# Patient Record
Sex: MALE | Race: WHITE | NOT HISPANIC OR LATINO | Employment: FULL TIME | URBAN - METROPOLITAN AREA
[De-identification: names, ages, dates, MRNs, and addresses within clinical notes are randomized per-mention and may not be internally consistent; named-entity substitution may affect disease eponyms.]

---

## 2016-07-25 LAB — CRC RECOMMENDATION EXT: NORMAL

## 2017-08-22 LAB
BUN SERPL-MCNC: 18 MG/DL (ref 7–18)
CALCIUM SERPL-MCNC: 9.6 MG/DL (ref 8.5–10.1)
CHLORIDE SERPL-SCNC: 109 MMOL/L (ref 98–107)
CHOLEST SERPL-MCNC: 245 MG/DL
CO2 SERPL-SCNC: 25 MMOL/L (ref 21–32)
CREAT SERPL-MCNC: 0.94 MG/DL (ref 0.6–1.3)
GLUCOSE SERPL-MCNC: 99 MG/DL (ref 74–106)
HDLC SERPL-MCNC: 48 MG/DL (ref 35–60)
LDLC SERPL CALC-MCNC: 163 MG/DL
POTASSIUM SERPL-SCNC: 4.4 MMOL/L (ref 3.5–5.1)
SODIUM SERPL-SCNC: 137 MEQ/L (ref 131–145)
TRIGL SERPL-MCNC: 121 MG/DL (ref 30–150)

## 2019-01-29 ENCOUNTER — HISTORICAL (OUTPATIENT)
Dept: ADMINISTRATIVE | Facility: HOSPITAL | Age: 59
End: 2019-01-29

## 2019-01-29 LAB
ABS NEUT (OLG): 3.6 X10(3)/MCL (ref 2.1–9.2)
ALBUMIN SERPL-MCNC: 4.4 GM/DL (ref 3.4–5)
ALBUMIN/GLOB SERPL: 2 {RATIO} (ref 1.5–2.5)
ALP SERPL-CCNC: 52 UNIT/L (ref 38–126)
ALT SERPL-CCNC: 35 UNIT/L (ref 7–52)
APPEARANCE, UA: CLEAR
AST SERPL-CCNC: 23 UNIT/L (ref 15–37)
BACTERIA #/AREA URNS AUTO: ABNORMAL /HPF
BILIRUB SERPL-MCNC: 0.4 MG/DL (ref 0.2–1)
BILIRUB UR QL STRIP: NEGATIVE MG/DL
BILIRUBIN DIRECT+TOT PNL SERPL-MCNC: 0.1 MG/DL (ref 0–0.5)
BILIRUBIN DIRECT+TOT PNL SERPL-MCNC: 0.3 MG/DL
BUN SERPL-MCNC: 19 MG/DL (ref 7–18)
CALCIUM SERPL-MCNC: 8.9 MG/DL (ref 8.5–10)
CHLORIDE SERPL-SCNC: 99 MMOL/L (ref 98–107)
CO2 SERPL-SCNC: 26 MMOL/L (ref 21–32)
COLOR UR: YELLOW
CREAT SERPL-MCNC: 1.16 MG/DL (ref 0.6–1.3)
ERYTHROCYTE [DISTWIDTH] IN BLOOD BY AUTOMATED COUNT: 12.4 % (ref 11.5–17)
GLOBULIN SER-MCNC: 2.2 GM/DL (ref 1.2–3)
GLUCOSE (UA): NEGATIVE MG/DL
GLUCOSE SERPL-MCNC: 92 MG/DL (ref 74–106)
HCT VFR BLD AUTO: 49.2 % (ref 42–52)
HGB BLD-MCNC: 15.7 GM/DL (ref 14–18)
HGB UR QL STRIP: ABNORMAL UNIT/L
KETONES UR QL STRIP: NEGATIVE MG/DL
LEUKOCYTE ESTERASE UR QL STRIP: NEGATIVE UNIT/L
LYMPHOCYTES # BLD AUTO: 1.6 X10(3)/MCL (ref 0.6–3.4)
LYMPHOCYTES NFR BLD AUTO: 27.6 % (ref 13–40)
MCH RBC QN AUTO: 30.2 PG (ref 27–31.2)
MCHC RBC AUTO-ENTMCNC: 32 GM/DL (ref 32–36)
MCV RBC AUTO: 95 FL (ref 80–94)
MONOCYTES # BLD AUTO: 0.6 X10(3)/MCL (ref 0.1–1.3)
MONOCYTES NFR BLD AUTO: 10.4 % (ref 0.1–24)
NEUTROPHILS NFR BLD AUTO: 62 % (ref 47–80)
NITRITE UR QL STRIP.AUTO: NEGATIVE
PH UR STRIP: 6 [PH]
PLATELET # BLD AUTO: 233 X10(3)/MCL (ref 130–400)
PMV BLD AUTO: 9.2 FL (ref 9.4–12.4)
POTASSIUM SERPL-SCNC: 3.8 MMOL/L (ref 3.5–5.1)
PROT SERPL-MCNC: 6.6 GM/DL (ref 6.4–8.2)
PROT UR QL STRIP: NEGATIVE MG/DL
RBC # BLD AUTO: 5.2 X10(6)/MCL (ref 4.7–6.1)
RBC #/AREA URNS HPF: ABNORMAL /HPF
SODIUM SERPL-SCNC: 130 MMOL/L (ref 136–145)
SP GR UR STRIP: 1.01
SQUAMOUS EPITHELIAL, UA: ABNORMAL /LPF
UROBILINOGEN UR STRIP-ACNC: 0.2 MG/DL
WBC # SPEC AUTO: 5.8 X10(3)/MCL (ref 4.5–11.5)
WBC #/AREA URNS AUTO: ABNORMAL /[HPF]

## 2019-02-01 ENCOUNTER — HISTORICAL (OUTPATIENT)
Dept: LAB | Facility: HOSPITAL | Age: 59
End: 2019-02-01

## 2019-02-02 LAB — GRAM STN SPEC: NORMAL

## 2019-02-04 LAB — FINAL CULTURE: NORMAL

## 2019-03-14 ENCOUNTER — HISTORICAL (OUTPATIENT)
Dept: LAB | Facility: HOSPITAL | Age: 59
End: 2019-03-14

## 2019-03-14 ENCOUNTER — HISTORICAL (OUTPATIENT)
Dept: ADMINISTRATIVE | Facility: HOSPITAL | Age: 59
End: 2019-03-14

## 2019-03-14 LAB
ABS NEUT (OLG): 2.5 X10(3)/MCL (ref 2.1–9.2)
ALBUMIN SERPL-MCNC: 4.4 GM/DL (ref 3.4–5)
ALBUMIN/GLOB SERPL: 1.76 {RATIO} (ref 1.5–2.5)
ALP SERPL-CCNC: 52 UNIT/L (ref 38–126)
ALT SERPL-CCNC: 41 UNIT/L (ref 7–52)
APPEARANCE, UA: CLEAR
AST SERPL-CCNC: 23 UNIT/L (ref 15–37)
BACTERIA #/AREA URNS AUTO: NORMAL /HPF
BILIRUB SERPL-MCNC: 0.6 MG/DL (ref 0.2–1)
BILIRUB UR QL STRIP: NEGATIVE MG/DL
BILIRUBIN DIRECT+TOT PNL SERPL-MCNC: 0.1 MG/DL (ref 0–0.5)
BILIRUBIN DIRECT+TOT PNL SERPL-MCNC: 0.5 MG/DL
BUN SERPL-MCNC: 19 MG/DL (ref 7–18)
CALCIUM SERPL-MCNC: 9.1 MG/DL (ref 8.5–10)
CHLORIDE SERPL-SCNC: 106 MMOL/L (ref 98–107)
CHOLEST SERPL-MCNC: 284 MG/DL (ref 0–200)
CHOLEST/HDLC SERPL: 6.2 {RATIO}
CO2 SERPL-SCNC: 25 MMOL/L (ref 21–32)
COLOR UR: YELLOW
CREAT SERPL-MCNC: 0.95 MG/DL (ref 0.6–1.3)
ERYTHROCYTE [DISTWIDTH] IN BLOOD BY AUTOMATED COUNT: 12.3 % (ref 11.5–17)
GLOBULIN SER-MCNC: 2.5 GM/DL (ref 1.2–3)
GLUCOSE (UA): NEGATIVE MG/DL
GLUCOSE SERPL-MCNC: 96 MG/DL (ref 74–106)
HCT VFR BLD AUTO: 46.8 % (ref 42–52)
HCV AB SERPL QL IA: NEGATIVE
HDLC SERPL-MCNC: 46 MG/DL (ref 35–60)
HGB BLD-MCNC: 16 GM/DL (ref 14–18)
HGB UR QL STRIP: NEGATIVE UNIT/L
KETONES UR QL STRIP: NEGATIVE MG/DL
LDLC SERPL CALC-MCNC: 184 MG/DL (ref 0–129)
LEUKOCYTE ESTERASE UR QL STRIP: NEGATIVE UNIT/L
LYMPHOCYTES # BLD AUTO: 2.1 X10(3)/MCL (ref 0.6–3.4)
LYMPHOCYTES NFR BLD AUTO: 43.2 % (ref 13–40)
MCH RBC QN AUTO: 30.6 PG (ref 27–31.2)
MCHC RBC AUTO-ENTMCNC: 34 GM/DL (ref 32–36)
MCV RBC AUTO: 90 FL (ref 80–94)
MONOCYTES # BLD AUTO: 0.2 X10(3)/MCL (ref 0.1–1.3)
MONOCYTES NFR BLD AUTO: 4.3 % (ref 0.1–24)
NEUTROPHILS NFR BLD AUTO: 52.5 % (ref 47–80)
NITRITE UR QL STRIP.AUTO: NEGATIVE
PH UR STRIP: 6.5 [PH]
PLATELET # BLD AUTO: 225 X10(3)/MCL (ref 130–400)
PMV BLD AUTO: 9.3 FL (ref 9.4–12.4)
POTASSIUM SERPL-SCNC: 4.4 MMOL/L (ref 3.5–5.1)
PROT SERPL-MCNC: 6.9 GM/DL (ref 6.4–8.2)
PROT UR QL STRIP: NEGATIVE MG/DL
PSA SERPL-MCNC: 3.24 NG/ML (ref 0–4.5)
RBC # BLD AUTO: 5.23 X10(6)/MCL (ref 4.7–6.1)
RBC #/AREA URNS HPF: NORMAL /HPF
SODIUM SERPL-SCNC: 137 MMOL/L (ref 136–145)
SP GR UR STRIP: 1.01
SQUAMOUS EPITHELIAL, UA: NORMAL /LPF
TRIGL SERPL-MCNC: 126 MG/DL (ref 30–150)
UROBILINOGEN UR STRIP-ACNC: 0.2 MG/DL
VLDLC SERPL CALC-MCNC: 25.2 MG/DL
WBC # SPEC AUTO: 4.8 X10(3)/MCL (ref 4.5–11.5)
WBC #/AREA URNS AUTO: NORMAL /[HPF]

## 2019-04-22 ENCOUNTER — HISTORICAL (OUTPATIENT)
Dept: ADMINISTRATIVE | Facility: HOSPITAL | Age: 59
End: 2019-04-22

## 2019-04-22 LAB
ABS NEUT (OLG): 4.3 X10(3)/MCL (ref 2.1–9.2)
ALBUMIN SERPL-MCNC: 4.4 GM/DL (ref 3.4–5)
ALBUMIN/GLOB SERPL: 2.1 {RATIO} (ref 1.5–2.5)
ALP SERPL-CCNC: 52 UNIT/L (ref 38–126)
ALT SERPL-CCNC: 24 UNIT/L (ref 7–52)
APPEARANCE, UA: CLEAR
AST SERPL-CCNC: 17 UNIT/L (ref 15–37)
BACTERIA #/AREA URNS AUTO: ABNORMAL /HPF
BILIRUB SERPL-MCNC: 0.5 MG/DL (ref 0.2–1)
BILIRUB UR QL STRIP: NEGATIVE MG/DL
BILIRUBIN DIRECT+TOT PNL SERPL-MCNC: 0.1 MG/DL (ref 0–0.5)
BILIRUBIN DIRECT+TOT PNL SERPL-MCNC: 0.4 MG/DL
BUN SERPL-MCNC: 17 MG/DL (ref 7–18)
CALCIUM SERPL-MCNC: 9.1 MG/DL (ref 8.5–10)
CHLORIDE SERPL-SCNC: 108 MMOL/L (ref 98–107)
CO2 SERPL-SCNC: 25 MMOL/L (ref 21–32)
COLOR UR: YELLOW
CREAT SERPL-MCNC: 1.14 MG/DL (ref 0.6–1.3)
ERYTHROCYTE [DISTWIDTH] IN BLOOD BY AUTOMATED COUNT: 12.4 % (ref 11.5–17)
GLOBULIN SER-MCNC: 2.2 GM/DL (ref 1.2–3)
GLUCOSE (UA): NEGATIVE MG/DL
GLUCOSE SERPL-MCNC: 109 MG/DL (ref 74–106)
HCT VFR BLD AUTO: 45.9 % (ref 42–52)
HGB BLD-MCNC: 15.9 GM/DL (ref 14–18)
HGB UR QL STRIP: ABNORMAL UNIT/L
KETONES UR QL STRIP: NEGATIVE MG/DL
LEUKOCYTE ESTERASE UR QL STRIP: NEGATIVE UNIT/L
LYMPHOCYTES # BLD AUTO: 1.8 X10(3)/MCL (ref 0.6–3.4)
LYMPHOCYTES NFR BLD AUTO: 26.5 % (ref 13–40)
MCH RBC QN AUTO: 31.3 PG (ref 27–31.2)
MCHC RBC AUTO-ENTMCNC: 35 GM/DL (ref 32–36)
MCV RBC AUTO: 90 FL (ref 80–94)
MONOCYTES # BLD AUTO: 0.7 X10(3)/MCL (ref 0.1–1.3)
MONOCYTES NFR BLD AUTO: 10.2 % (ref 0.1–24)
NEUTROPHILS NFR BLD AUTO: 63.3 % (ref 47–80)
NITRITE UR QL STRIP.AUTO: NEGATIVE
PH UR STRIP: 5.5 [PH]
PLATELET # BLD AUTO: 200 X10(3)/MCL (ref 130–400)
PMV BLD AUTO: 9.5 FL (ref 9.4–12.4)
POTASSIUM SERPL-SCNC: 4.2 MMOL/L (ref 3.5–5.1)
PROT SERPL-MCNC: 6.5 GM/DL (ref 6.4–8.2)
PROT UR QL STRIP: NEGATIVE MG/DL
RBC # BLD AUTO: 5.08 X10(6)/MCL (ref 4.7–6.1)
RBC #/AREA URNS HPF: ABNORMAL /HPF
SODIUM SERPL-SCNC: 138 MMOL/L (ref 136–145)
SP GR UR STRIP: 1.02
SQUAMOUS EPITHELIAL, UA: ABNORMAL /LPF
UROBILINOGEN UR STRIP-ACNC: 0.2 MG/DL
WBC # SPEC AUTO: 6.8 X10(3)/MCL (ref 4.5–11.5)
WBC #/AREA URNS AUTO: ABNORMAL /[HPF]

## 2019-04-29 ENCOUNTER — HISTORICAL (OUTPATIENT)
Dept: LAB | Facility: HOSPITAL | Age: 59
End: 2019-04-29

## 2019-04-29 LAB — GRAM STN SPEC: NORMAL

## 2019-05-01 LAB — FINAL CULTURE: NORMAL

## 2020-06-11 ENCOUNTER — HISTORICAL (OUTPATIENT)
Dept: ADMINISTRATIVE | Facility: HOSPITAL | Age: 60
End: 2020-06-11

## 2020-06-11 LAB
ABS NEUT (OLG): 2.2 X10(3)/MCL (ref 2.1–9.2)
ALBUMIN SERPL-MCNC: 4.8 GM/DL (ref 3.4–5)
ALBUMIN/GLOB SERPL: 2.29 {RATIO} (ref 1.5–2.5)
ALP SERPL-CCNC: 51 UNIT/L (ref 38–126)
ALT SERPL-CCNC: 32 UNIT/L (ref 7–52)
APPEARANCE, UA: CLEAR
AST SERPL-CCNC: 24 UNIT/L (ref 15–37)
BACTERIA #/AREA URNS AUTO: NORMAL /HPF
BILIRUB SERPL-MCNC: 0.5 MG/DL (ref 0.2–1)
BILIRUB UR QL STRIP: NEGATIVE MG/DL
BILIRUBIN DIRECT+TOT PNL SERPL-MCNC: 0.1 MG/DL (ref 0–0.5)
BILIRUBIN DIRECT+TOT PNL SERPL-MCNC: 0.4 MG/DL
BUN SERPL-MCNC: 20 MG/DL (ref 7–18)
CALCIUM SERPL-MCNC: 9.6 MG/DL (ref 8.5–10)
CHLORIDE SERPL-SCNC: 104 MMOL/L (ref 98–107)
CHOLEST SERPL-MCNC: 237 MG/DL (ref 0–200)
CHOLEST/HDLC SERPL: 5.2 {RATIO}
CO2 SERPL-SCNC: 29 MMOL/L (ref 21–32)
COLOR UR: YELLOW
CREAT SERPL-MCNC: 1.08 MG/DL (ref 0.6–1.3)
ERYTHROCYTE [DISTWIDTH] IN BLOOD BY AUTOMATED COUNT: 13 % (ref 11.5–17)
GLOBULIN SER-MCNC: 2.2 GM/DL (ref 1.2–3)
GLUCOSE (UA): NEGATIVE MG/DL
GLUCOSE SERPL-MCNC: 95 MG/DL (ref 74–106)
HCT VFR BLD AUTO: 49.1 % (ref 42–52)
HDLC SERPL-MCNC: 46 MG/DL (ref 35–60)
HGB BLD-MCNC: 16.5 GM/DL (ref 14–18)
HGB UR QL STRIP: NEGATIVE UNIT/L
KETONES UR QL STRIP: NEGATIVE MG/DL
LDLC SERPL CALC-MCNC: 155 MG/DL (ref 0–129)
LEUKOCYTE ESTERASE UR QL STRIP: NEGATIVE UNIT/L
LYMPHOCYTES # BLD AUTO: 1.6 X10(3)/MCL (ref 0.6–3.4)
LYMPHOCYTES NFR BLD AUTO: 35.8 % (ref 13–40)
MCH RBC QN AUTO: 30.4 PG (ref 27–31.2)
MCHC RBC AUTO-ENTMCNC: 34 GM/DL (ref 32–36)
MCV RBC AUTO: 90 FL (ref 80–94)
MONOCYTES # BLD AUTO: 0.6 X10(3)/MCL (ref 0.1–1.3)
MONOCYTES NFR BLD AUTO: 12.8 % (ref 0.1–24)
NEUTROPHILS NFR BLD AUTO: 51.4 % (ref 47–80)
NITRITE UR QL STRIP.AUTO: NEGATIVE
PH UR STRIP: 6 [PH]
PLATELET # BLD AUTO: 213 X10(3)/MCL (ref 130–400)
PMV BLD AUTO: 9.4 FL (ref 9.4–12.4)
POTASSIUM SERPL-SCNC: 4.6 MMOL/L (ref 3.5–5.1)
PROT SERPL-MCNC: 6.9 GM/DL (ref 6.4–8.2)
PROT UR QL STRIP: NEGATIVE MG/DL
PSA SERPL-MCNC: 3.59 NG/ML (ref 0–4.5)
RBC # BLD AUTO: 5.43 X10(6)/MCL (ref 4.7–6.1)
RBC #/AREA URNS HPF: NORMAL /HPF
SODIUM SERPL-SCNC: 138 MMOL/L (ref 136–145)
SP GR UR STRIP: 1.02
SQUAMOUS EPITHELIAL, UA: NORMAL /LPF
TRIGL SERPL-MCNC: 134 MG/DL (ref 30–150)
UROBILINOGEN UR STRIP-ACNC: 0.2 MG/DL
VLDLC SERPL CALC-MCNC: 26.8 MG/DL
WBC # SPEC AUTO: 4.4 X10(3)/MCL (ref 4.5–11.5)
WBC #/AREA URNS AUTO: NORMAL /[HPF]

## 2020-08-26 ENCOUNTER — HISTORICAL (OUTPATIENT)
Dept: PREADMISSION TESTING | Facility: HOSPITAL | Age: 60
End: 2020-08-26

## 2020-09-01 ENCOUNTER — HISTORICAL (OUTPATIENT)
Dept: ADMINISTRATIVE | Facility: HOSPITAL | Age: 60
End: 2020-09-01

## 2021-06-18 ENCOUNTER — HISTORICAL (OUTPATIENT)
Dept: ADMINISTRATIVE | Facility: HOSPITAL | Age: 61
End: 2021-06-18

## 2021-06-18 LAB
ABS NEUT (OLG): 2.6 X10(3)/MCL (ref 2.1–9.2)
ALBUMIN SERPL-MCNC: 4.6 GM/DL (ref 3.4–5)
ALBUMIN/GLOB SERPL: 2.19 {RATIO} (ref 1.5–2.5)
ALP SERPL-CCNC: 46 UNIT/L (ref 38–126)
ALT SERPL-CCNC: 24 UNIT/L (ref 7–52)
APPEARANCE, UA: CLEAR
AST SERPL-CCNC: 20 UNIT/L (ref 15–37)
BACTERIA #/AREA URNS AUTO: NORMAL /HPF
BILIRUB SERPL-MCNC: 0.8 MG/DL (ref 0.2–1)
BILIRUB UR QL STRIP: NEGATIVE MG/DL
BILIRUBIN DIRECT+TOT PNL SERPL-MCNC: 0.1 MG/DL (ref 0–0.5)
BILIRUBIN DIRECT+TOT PNL SERPL-MCNC: 0.7 MG/DL
BUN SERPL-MCNC: 15 MG/DL (ref 7–18)
CALCIUM SERPL-MCNC: 9.7 MG/DL (ref 8.5–10.1)
CHLORIDE SERPL-SCNC: 106 MMOL/L (ref 98–107)
CHOLEST SERPL-MCNC: 258 MG/DL (ref 0–200)
CHOLEST/HDLC SERPL: 5.7 {RATIO}
CO2 SERPL-SCNC: 28 MMOL/L (ref 21–32)
COLOR UR: YELLOW
CREAT SERPL-MCNC: 1.02 MG/DL (ref 0.6–1.3)
ERYTHROCYTE [DISTWIDTH] IN BLOOD BY AUTOMATED COUNT: 12.2 % (ref 11.5–17)
EST CREAT CLEARANCE SER (OHS): 122.25 ML/MIN
GLOBULIN SER-MCNC: 2.1 GM/DL (ref 1.2–3)
GLUCOSE (UA): NEGATIVE MG/DL
GLUCOSE SERPL-MCNC: 88 MG/DL (ref 74–106)
HCT VFR BLD AUTO: 45.1 % (ref 42–52)
HDLC SERPL-MCNC: 45 MG/DL (ref 35–60)
HGB BLD-MCNC: 15.3 GM/DL (ref 14–18)
HGB UR QL STRIP: NEGATIVE UNIT/L
KETONES UR QL STRIP: NEGATIVE MG/DL
LDLC SERPL CALC-MCNC: 190 MG/DL (ref 0–129)
LEUKOCYTE ESTERASE UR QL STRIP: NEGATIVE UNIT/L
LYMPHOCYTES # BLD AUTO: 1.5 X10(3)/MCL (ref 0.6–3.4)
LYMPHOCYTES NFR BLD AUTO: 32.7 % (ref 13–40)
MCH RBC QN AUTO: 30.2 PG (ref 27–31.2)
MCHC RBC AUTO-ENTMCNC: 34 GM/DL (ref 32–36)
MCV RBC AUTO: 89 FL (ref 80–94)
MONOCYTES # BLD AUTO: 0.6 X10(3)/MCL (ref 0.1–1.3)
MONOCYTES NFR BLD AUTO: 11.9 % (ref 0.1–24)
NEUTROPHILS NFR BLD AUTO: 55.4 % (ref 47–80)
NITRITE UR QL STRIP.AUTO: NEGATIVE
PH UR STRIP: 6 [PH]
PLATELET # BLD AUTO: 238 X10(3)/MCL (ref 130–400)
PMV BLD AUTO: 9.8 FL (ref 9.4–12.4)
POTASSIUM SERPL-SCNC: 4.1 MMOL/L (ref 3.5–5.1)
PROT SERPL-MCNC: 6.7 GM/DL (ref 6.4–8.2)
PROT UR QL STRIP: NEGATIVE MG/DL
PSA SERPL-MCNC: 3.96 NG/ML (ref 0–4.5)
RBC # BLD AUTO: 5.07 X10(6)/MCL (ref 4.7–6.1)
RBC #/AREA URNS HPF: NORMAL /HPF
SODIUM SERPL-SCNC: 140 MMOL/L (ref 136–145)
SP GR UR STRIP: 1.02
SQUAMOUS EPITHELIAL, UA: NORMAL /LPF
TRIGL SERPL-MCNC: 160 MG/DL (ref 30–150)
UROBILINOGEN UR STRIP-ACNC: 0.2 MG/DL
VLDLC SERPL CALC-MCNC: 32 MG/DL
WBC # SPEC AUTO: 4.7 X10(3)/MCL (ref 4.5–11.5)
WBC #/AREA URNS AUTO: NORMAL /[HPF]

## 2021-08-09 ENCOUNTER — HISTORICAL (OUTPATIENT)
Dept: ADMINISTRATIVE | Facility: HOSPITAL | Age: 61
End: 2021-08-09

## 2021-08-09 LAB — URATE SERPL-MCNC: 6.7 MG/DL (ref 2.6–7.2)

## 2021-08-20 ENCOUNTER — HISTORICAL (OUTPATIENT)
Dept: ADMINISTRATIVE | Facility: HOSPITAL | Age: 61
End: 2021-08-20

## 2022-02-11 ENCOUNTER — HISTORICAL (OUTPATIENT)
Dept: ADMINISTRATIVE | Facility: HOSPITAL | Age: 62
End: 2022-02-11

## 2022-03-02 ENCOUNTER — HISTORICAL (OUTPATIENT)
Dept: ADMINISTRATIVE | Facility: HOSPITAL | Age: 62
End: 2022-03-02

## 2022-04-10 ENCOUNTER — HISTORICAL (OUTPATIENT)
Dept: ADMINISTRATIVE | Facility: HOSPITAL | Age: 62
End: 2022-04-10
Payer: COMMERCIAL

## 2022-04-29 VITALS
DIASTOLIC BLOOD PRESSURE: 76 MMHG | BODY MASS INDEX: 34.7 KG/M2 | WEIGHT: 256.19 LBS | SYSTOLIC BLOOD PRESSURE: 122 MMHG | HEIGHT: 72 IN

## 2022-04-30 NOTE — OP NOTE
Patient:   Suleiman Rutledge            MRN: [id number]            FIN: [id number]               Age:   60 years     Sex:  Male     :  1960   Associated Diagnoses:   None   Author:   Lamberto Montanez MD      Preoperative Diagnosis: Cataract Right eye    Postoperative Diagnosis: Cataract Right eye    Procedure: Phacoemulsification with intaocular lens implantations Right eye    Surgeon: Lamberto Montanez MD    Assistant: Cinthya Sinclair, [facility]    Anestheisa: MAC    Complications: None    The patient was brought into the operating suite, where the patient was correctly identified as was the operative eye via timeout.  The patient was prepped and draped in a sterile ophthalmic fashion.  A lid speculum was placed in the operative eye and the microscope was brought into place.  A 1.0mm paracentesis was then made at (12) o'clock.  The anterior chamber was filled with Endocoat.  A (temporal) clear corneal incision was made with a 2.4 mm keratome.  A 6 mm corneal marking ring was used to sumeet the cornea centered over the visual axis.  A 5.00 mm continuous curvilinear capsulorhexis was fashioned using a cystotome and microcapsular forceps.  Hydrodissection and hydrodelineation was performed with upreserved 1% Xylocaine.  The nucleus was then phacoemulsified with the Abbott phacoemulsification hand-piece with a total of (7) EFX.  The cortex was then removed with the I/A hand-piece. The lens model ([id number]) with a power of (28.0) was placed in the capsular bag.  The Helon was then removed from the eye with the I/A hand piece.  The anterior chamber was inflated and the wounds were hydrated with BSS.  The wounds were checked with Weck-Elizabeth sponges and found to be watertight.  The lid speculum was removed and topical antibiotics were placed on the operative eye.  The patient was brought to PACU in good condition.

## 2022-05-02 NOTE — HISTORICAL [facility] CERNER
This is a historical note converted from [employer]. Formatting and pictures may have been removed.  Please reference Cecilia for original formatting and attached multimedia. Chief Complaint  wellness  History of Present Illness  The patient is a 58 year old white male here today for a complete Wellness physical.? The patient?has?no acute complaints today. The patient also carries a diagnosis of?chronic GERD/ED, which is assessed today?as stable.? Exercise is reported as high and includes?weights and cardio with no chest pain or shortness of breath.?? Diet modifications are reported as?lean proteins and increased vegetable matter?and decrease fried fatty foods and decreased volume.?? Previous documented weight is recorded? as charted.  The patient also reports?quitting smoking 6 months ago and plans on?staying quit?indefinitely.? Also note of some?very small?nodules?noted on his calcium score?in the past?that recommended?follow-up.  GERD stable currently?on intermittent Nexium use and diet control.? Insomnia and anxiety-depression controlled with lifestyle modifications and no medication.? No suicidal or homicidal ideations.  Review of Systems  Constitutional:?no weight gain,?no weight loss,?no fatigue,?no fever,?no chills,?no weakness,?no trouble sleeping.  Eyes:?no vision loss/changes,?no glasses or contacts,?no pain,?no redness,?no blurry or double vision,?no flashing lights,?no specks,?no glaucoma,?no cataracts.  Last eye exam:?within last 6 months  Head:?no headache,?no head injury,?no neck pain.?  Neck:??no lumps,?no swollen glands,?no stiffness.  Ears:?no decreased hearing,?no ringing,?no earache,?no drainage.?  Nose:?no stuffiness,?no discharge,?no itching,?no hay fever,?no nosebleeds,?no sinus pain.  Throat:?no bleeding,?no dentures,?no sore tongue,?no dry mouth,?no sore throat,?no hoarseness,?no thrush,?no non-healing sores.  Cardiovascular:?no chest pain or discomfort,?no tightness,?no palpitations,?no SOB  with activity,?no difficulty breathing while supine,?no swelling,?no sudden awakening from sleep with SOB.  Vascular:?no calf pain with walking,?no leg cramping.  Respiratory:??no cough,?no sputum,?no coughing up blood,?no SOB,?no wheezing,?no painful breathing.  Gastrointestinal:?no swallowing difficulty,?no heartburn,?no change in appetite,?no nausea,?no change in bowel habits,?no rectal bleeding,?no constipation,?no diarrhea,?no yellow eyes or skin.  Urinary:?no frequency,?no urgency,?no burning or pain,?no blood in urine,?no incontinence,?no change in urinary strength.  Musculoskeletal:?no muscle or joint pain,?no stiffness,?no back pain,?no redness of joints,?no swelling of joints,?no trauma.  Skin:?no rashes,?no lumps,?no itching,?no dryness,?color normal for ethnicity,?no hair or nail changes.  Neurologic:?no dizziness,?no fainting,?no seizures,?no weakness,?no numbness,?no tingling,?no tremors.  Psychiatric:?no nervousness,?no stress,?no depression,?no memory loss.  Endocrine:?no heat or cold intolerance,?no sweating,?no frequent urination,?no thirst,?no change in appetite.  Hematologic:?no ease of bruising,?no ease of bleeding.  ?  ?  ?  ?  Physical Exam  Vitals & Measurements  BP:?120/78?  HT:?185?cm? WT:?111.4?kg? BMI:?32.55?  VITAL SIGNS:? Reviewed.? ?  GENERAL:? In?no apparent distress.? Alert and Oriented x3  HEAD:?No signsof head trauma. Normocephalic  EYES:? Pupils?equal/round/reactive.? Extraocular motionsintact.  EARS:? Hearing?grossly intact. TMs and EAC?clear  MOUTH:??Oropharynx is clear.?No erythema. No exudates  NECK:? No LAD. No JVD. No thyromegaly. No bruits  CHEST:? Chest with clear breath sounds bilaterally.? No wheezes, rales, or rhonchi. Good air movement  CARDIAC:? Regular rate and rhythm.? S1 and S2, without murmurs, gallops, or rubs.  VASCULAR:??No Edema.? Peripheral pulses normal and equal in all extremities.  ABDOMEN:?Soft, without detectable tenderness.??No sign of distention.?No  rebound or guarding, and no masses palpated.? ?Bowel Sounds present and normal x 4.  MUSCULOSKELETAL:??Good range of motion of all major joints.?5/5 strength throughout. Extremities without clubbing, cyanosis or edema.  NEUROLOGIC EXAM:? Alert and oriented x 3.? No focal sensory or strength deficits.? ?Speech normal.? Follows commands.  PSYCHIATRIC:? Mood normal.  SKIN:??No rash or lesions.  ESTHER: Nontender, normal size, no masses or nodules, normal sphincter tone, symmetric  ?  Assessment/Plan  1.?Wellness examination?Z00.00  ?Assessment/Plan:  ?   1.?Wellness  ?   -Health and Exercise Prescription issued and educated upon  ?   -10% weight loss goal  ?   -Lifestyle counseling >20minutes  ?   -Diet: Lean proteins and increased vegetable matter  ?   -Screening: Colonoscopy?is up-to-date;?prostate exam done today-normal; PSA drawn?ESTHER: Nontender, normal size, no masses or nodules, normal sphincter tone, symmetric  ?   -Vaccines: Shingles unavailable/patient will return for Shing Grix when available in office  ?   -Labs:?See below  ?   2.Comorbidities:?See below  ?  3. Referrals:?None  ?  4. RTC: 12-month wellness  ?  Ordered:  Clinic Follow up, *Est. 03/17/20 8:15:00 CDT, Order for future visit, Wellness examination, [facility]  Comprehensive Metabolic Panel, Routine collect, 03/14/19 10:10:00 CDT, Blood, Stop date 03/14/19 10:10:00 CDT, Lab Collect, Wellness examination, 03/14/19 10:10:00 CDT  Hepatitis C Antibody, Routine collect, 03/14/19 10:06:00 CDT, Blood, Order for future visit, Stop date 03/14/19 10:06:00 CDT, Lab Collect, Wellness examination, 03/14/19 10:06:00 CDT  Lipid Panel, Routine collect, 03/14/19 10:10:00 CDT, Blood, Stop date 03/14/19 10:10:00 CDT, Lab Collect, Wellness examination, 03/14/19 10:10:00 CDT  Preventative Health Care Est 40-64 years [id number] PC, Wellness examination, [facility] - [facility], 03/14/19 10:07:00 CDT  Prostate Specific Antigen, Routine collect, 03/14/19 10:10:00 CDT, Blood, Stop date  03/14/19 10:10:00 CDT, Lab Collect, Wellness examination  Screening for prostate cancer, 03/14/19 10:10:00 CDT  Schedule Diagnostics Study, CT chest without contrast, next practical, 03/14/19 10:07:00 CDT, Incidental pulmonary nodule  Wellness examination  Urinalysis Complete no reflex, Routine collect, Urine, 03/14/19 10:09:00 CDT, Stop date 03/14/19 10:10:00 CDT, Nurse collect, Wellness examination  ?  2.?Chronic GERD?K21.9  ?-Stable currently on diet control and as needed Nexium  ?  3.?Quit smoking within past year?Z87.891  ?-[facility] continued cessation of smoking  -No indication for?screening?CT of the lung?as patient?only smoked?1 pack/week for approximately 30 years?and does not meet criteria?for screening coverage  ?  4.?Incidental pulmonary nodule?R91.1  -Noted?incidental tiny pulmonary nodules?on?calcium score a few years ago, it was recommended?for?interval repeat for stability  -We will schedule CT of the lungs?without contrast for initial?reevaluation  -Patient asymptomatic currently  Ordered:  Schedule Diagnostics Study, CT chest without contrast, next practical, 03/14/19 10:07:00 CDT, Incidental pulmonary nodule  Wellness examination  ?  5.?ED (erectile dysfunction)?N52.9  ?-Trial of Cialis 20 mg?1 tablet by mouth daily as needed?erection #6 no refills/side effects discussed at length/patient voiced understanding?and on no nitrates  Ordered:  tadalafil, 20 mg = 1 tab(s), Oral, Daily, PRN PRN as needed for erectile dysfunction, # 6 tab(s), 5 Refill(s), Pharmacy: [facility]  ?  Screening for prostate cancer?Z12.5  Ordered:  Prostate Specific Antigen, Routine collect, 03/14/19 10:10:00 CDT, Blood, Stop date 03/14/19 10:10:00 CDT, Lab Collect, Wellness examination  Screening for prostate cancer, 03/14/19 10:10:00 CDT  ?   Problem List/Past Medical History  Ongoing  Anxiety depression  Chronic GERD  ED (erectile dysfunction)  Incidental pulmonary  nodule  Insomnia  Obesity  Historical  Tobacco abuse  Procedure/Surgical History  Colonoscopy (07/25/2016)   Medications  Cialis 20 mg oral tablet, 20 mg= 1 tab(s), Oral, Daily, PRN, 5 refills  NexIUM, Oral, Daily  Allergies  No Known Allergies  Social History  Tobacco  Former smoker, quit more than 30 days ago, N/A, 03/14/2019  Former smoker, quit more than 30 days ago, N/A, 01/29/2019  Family History  CAD - Coronary artery disease: Brother.  Leukemia: Father.  Primary malignant neoplasm of breast: Mother.  Primary malignant neoplasm of prostate: Father (Dx at 79).  TIA: Mother.  Immunizations  Vaccine Date Status   influenza virus vaccine, inactivated 11/11/2018 Recorded   pneumococcal 13-valent conjugate vaccine 2015 Recorded   tetanus/diphth/pertuss (Tdap) adult/adol 2015 Recorded   Health Maintenance  Health Maintenance  ???Pending?(in the next year)  ??? ??OverDue  ??? ? ? ?Diabetes Screening due??and every?  ??? ??Due?  ??? ? ? ?ADL Screening due??03/14/19??and every 1??year(s)  ??? ? ? ?Alcohol Misuse Screening due??03/14/19??and every 1??year(s)  ??? ? ? ?Aspirin Therapy for CVD Prevention due??03/14/19??and every 1??year(s)  ??? ? ? ?Depression Screening due??03/14/19??and every?  ??? ??Due In Future?  ??? ? ? ?Blood Pressure Screening not due until??03/13/20??and every 1??year(s)  ??? ? ? ?Body Mass Index Check not due until??03/13/20??and every 1??year(s)  ???Satisfied?(in the past 1 year)  ??? ??Satisfied?  ??? ? ? ?Blood Pressure Screening on??03/14/19.??Satisfied by Kadi Harden MA  ??? ? ? ?Body Mass Index Check on??03/14/19.??Satisfied by Kadi Harden MA  ??? ? ? ?Diabetes Screening on??01/29/19.??Satisfied by Dustin Alvarez  ??? ? ? ?Influenza Vaccine on??11/11/18.??Satisfied by Anitha Vargas CMA  ??? ? ? ?Obesity Screening on??03/14/19.??Satisfied by Kadi Harden MA  ?  ?

## 2022-05-02 NOTE — HISTORICAL [facility] CERNER
This is a historical note converted from [employer]. Formatting and pictures may have been removed.  Please reference Cecilia for original formatting and attached multimedia. Chief Complaint  wellness  History of Present Illness  The patient is a 60?year old?male here today for a complete Wellness Physical exam. The patient has?no acute complaints today. The patient also carries a diagnosis of mentioned, which is assessed today. Exercise is reported as?moderate and includes walking/cardio/weights. Monitors diet on a daily basis. Previous documented weight at last office visit is 251.?Overall feeling well, denies any acute issues or concerns at present.  Review of Systems  Constitutional:?no weight gain,?weight loss,?no fatigue,?no fever,?no chills,?no weakness,?no trouble sleeping.  Eyes:?no vision loss/changes,?no glasses or contacts,?no pain,?no redness,?no blurry or double vision,?no flashing lights,?no specks,?no glaucoma,?cataracts.  Last eye exam:?within last 6 months  Head:?no headache,?no head injury,?no neck pain.?  Neck:??no lumps,?no swollen glands,?no stiffness.  Ears:?no decreased hearing,?no ringing,?no earache,?no drainage.?  Nose:?no stuffiness,?no discharge,?no itching,?no hay fever,?no nosebleeds,?no sinus pain.  Throat:?no bleeding,?no dentures,?no sore tongue,?no dry mouth,?no sore throat,?no hoarseness,?no thrush,?no non-healing sores.  Cardiovascular:?no chest pain or discomfort,?no tightness,?no palpitations,?no SOB with activity,?no difficulty breathing while supine,?no swelling,?no sudden awakening from sleep with SOB.  Vascular:?no calf pain with walking,?no leg cramping.  Respiratory:??no cough,?no sputum,?no coughing up blood,?no SOB,?no wheezing,?no painful breathing.  Gastrointestinal:?no swallowing difficulty,?no heartburn,?no change in appetite,?no nausea,?no change in bowel habits,?no rectal bleeding,?no constipation,?no diarrhea,?no yellow eyes or skin.  Urinary:?no frequency,?no  urgency,?no burning or pain,?no blood in urine,?no incontinence,?no change in urinary strength.  Musculoskeletal:?no muscle or joint pain,?no stiffness,?no back pain,?no redness of joints,?no swelling of joints,?no trauma.  Skin:?no rashes,?no lumps,?no itching,?no dryness,?color normal for ethnicity,?no hair or nail changes.  Neurologic:?no dizziness,?no fainting,?no seizures,?no weakness,?no numbness,?no tingling,?no tremors.  Psychiatric:?no nervousness,?no stress,?no depression,?no memory loss.  Endocrine:?no heat or cold intolerance,?no sweating,?no frequent urination,?no thirst,?no change in appetite.  Hematologic:?no ease of bruising,?no ease of bleeding.  ?  Physical Exam  Vitals & Measurements  BP:?136/82?  BMI:?33.08?  General- In NAD, A&O x 4  ?   Eye- PERRL, EOMI  ?   HENT- TM/EAC clear, Nose mucosa WNL, No D/C, No Sinus Tenderness, O/P without erythema or exudates?  ?   Neck- S, No LA, No Thyromegaly, No bruits, No JVD  ?   Respiratory- CTA, No wheezing, No crackles, No rhonchi  ?   Cardiovascular- RRR W/O MGR, Pulses equal throughout  ?   Gastrointestinal- S, NT, No HSM, NABS, No masses, No peritoneal signs?  ?   Lymphatics- WNL  ?  Musculoskeletal- No tenderness, Joints WNL, FROM, Neg SLR, No CCE  ?  Integumentary- Warm, dry, intact, No lesions/rashes/hives  ?  Neurologic- No Motor/Sensory deficits, Reflexes +2 throughout, CN II-XII intact, Neg cerebellar tests  ?  ESTHER- Smooth/intact, no nodules noted, no pain, tolerated well  Assessment/Plan  1.?Wellness examination?Z00.00  ?1. Wellness  - Health and Exercise educated upon  -10% weight loss goal  -Lifestyle counseling > 20 minutes  -Diet: Healthy choices  -Screening: Colon UTD, Eye Exam UTD, ESTHER today  -Vaccines: Tdap/Prevnar UTD  -LabS: CBC, CMP, LIPIDS, UA, PSA- wishes to fax to Dr. Cervantes as he does plan to f/u there  ?   2. Comorbidities- mentioned  ?   3. Referrals none at present  ?   4. RTC in?12 months sooner if needed  Ordered:  Automated  Diff, Routine collect, 06/11/20 8:51:00 CDT, Blood, Collected, Stop date 06/11/20 8:51:00 CDT, Lab Collect, Wellness examination, 06/11/20 8:51:00 CDT  CBC w/ Auto Diff, Routine collect, 06/11/20 8:51:00 CDT, Blood, Stop date 06/11/20 8:51:00 CDT, Lab Collect, Wellness examination, 06/11/20 8:51:00 CDT  Clinic Follow up, *Est. 06/11/21 3:00:00 CDT, Order for future visit, Wellness examination, [facility]  Comprehensive Metabolic Panel, Routine collect, 06/11/20 8:51:00 CDT, Blood, Stop date 06/11/20 8:51:00 CDT, Lab Collect, Wellness examination  HLD (hyperlipidemia), 06/11/20 8:51:00 CDT  Lipid Panel, Routine collect, 06/11/20 8:51:00 CDT, Blood, Stop date 06/11/20 8:51:00 CDT, Lab Collect, Wellness examination  HLD (hyperlipidemia), 06/11/20 8:51:00 CDT  Preventative Health Care New 40-64 years [id number] PC, Wellness examination, [facility] - [facility], 06/11/20 8:46:00 CDT  Urinalysis no Reflex, Routine collect, Urine, 06/11/20 8:51:00 CDT, Stop date 06/11/20 8:51:00 CDT, Nurse collect, Wellness examination  ?  2.?Chronic GERD?K21.9  1. Refill Nexium as directed  2. Diet and exercise as tolerated (TLC)  ?  3.?Need for vaccination?Z23  1. Shingrix #1 sent to pharmacy, repeat dose in 2-6 months  ?  4.?Screening for prostate cancer?Z12.5  1. PSA today, will fax to Dr. Cervantes as per requested  Ordered:  Prostate Specific Antigen, Routine collect, 06/11/20 8:51:00 CDT, Blood, Stop date 06/11/20 8:51:00 CDT, Lab Collect, Screening for prostate cancer, 06/11/20 8:51:00 CDT  ?  5.?ED (erectile dysfunction)?N52.9  1. Refill on Cialis as directed  Ordered:  tadalafil, 20 mg = 1 tab(s), Oral, Daily, PRN PRN as needed for erectile dysfunction, # 6 tab(s), 5 Refill(s), Pharmacy: [facility], 185, cm, Height/Length Dosing, 04/22/19 13:30:00 CDT, 113.9, kg, Weight Dosing, 04/22/19 13:30:00 CDT  ?  6.?Incidental pulmonary nodule?R91.1  1. Repeat CT of lungs without contrast- f/u study from 1 year ago to ensure  stability  Ordered:  Schedule Diagnostics Study, CT of lungs, without contrast Oral Contrast Requested, next available, 06/11/20 8:56:00 CDT, Incidental pulmonary nodule  ?  Orders:  aspirin, 81 mg = 1 tab(s), Oral, Daily, # 30 tab(s), 0 Refill(s), other reason (Rx), 185, cm, Height/Length Dosing, 04/22/19 13:30:00 CDT, 113.9, kg, Weight Dosing, 04/22/19 13:30:00 CDT  Clinic Follow-up PRN, 06/11/20 8:46:00 CDT, [facility], Future Order  Referrals  Clinic Follow up, *Est. 06/11/21 3:00:00 CDT, Order for future visit, Wellness examination, [facility]  Clinic Follow-up PRN, 06/11/20 8:46:00 CDT, [facility] - [facility], Future Order   Problem List/Past Medical History  Ongoing  Anxiety depression  Chronic GERD  ED (erectile dysfunction)  Incidental pulmonary nodule  Insomnia  Obesity  Historical  Tobacco abuse  Procedure/Surgical History  Colonoscopy (07/25/2016)   Medications  aspirin 81 mg oral Delayed Release (EC) tablet, 81 mg= 1 tab(s), Oral, Daily  Cialis 20 mg oral tablet, 20 mg= 1 tab(s), Oral, Daily, PRN, 5 refills  Coenzyme Q10 100 mg oral capsule, 100 mg= 1 cap(s), Oral, Daily  ketoconazole 2% topical shampoo, See Instructions  NexIUM, Oral, Daily  Allergies  No Known Allergies  Social History  Tobacco  Former smoker, quit more than 30 days ago, N/A, 04/22/2019  Former smoker, quit more than 30 days ago, N/A, 03/14/2019  Former smoker, quit more than 30 days ago, N/A, 01/29/2019  Family History  CAD - Coronary artery disease: Brother.  Leukemia: Father.  Primary malignant neoplasm of breast: Mother.  Primary malignant neoplasm of prostate: Father (Dx at 79).  TIA: Mother.  Immunizations  Vaccine Date Status   influenza virus vaccine, inactivated 10/02/2019 Recorded   influenza virus vaccine, inactivated 11/11/2018 Recorded   pneumococcal 13-valent conjugate vaccine 2015 Recorded   tetanus/diphth/pertuss (Tdap) adult/adol 2015 Recorded   Health Maintenance  Health Maintenance  ???Pending?(in the next year)  ???  ??OverDue  ??? ? ? ?Diabetes Screening due??and every?  ??? ? ? ?Alcohol Misuse Screening due??01/01/20??and every 1??year(s)  ??? ??Due?  ??? ? ? ?ADL Screening due??06/11/20??and every 1??year(s)  ??? ? ? ?Depression Screening due??06/11/20??and every?  ??? ? ? ?Zoster Vaccine due??06/11/20??and every 100??year(s)  ??? ??Due In Future?  ??? ? ? ?Obesity Screening not due until??01/01/21??and every 1??year(s)  ???Satisfied?(in the past 1 year)  ??? ??Satisfied?  ??? ? ? ?Aspirin Therapy for CVD Prevention on??06/11/20.??Satisfied by Maritza Snow NP  ??? ? ? ?Blood Pressure Screening on??06/11/20.??Satisfied by Maritza Snow NP  ??? ? ? ?Body Mass Index Check on??06/11/20.??Satisfied by Kadi Harden MA  ??? ? ? ?Hypertension Management-Blood Pressure on??06/11/20.??Satisfied by Gwendolyn WILLS Maritza  ??? ? ? ?Influenza Vaccine on??10/02/19.??Satisfied by Kadi Harden MA  ??? ? ? ?Obesity Screening on??06/11/20.??Satisfied by Kadi Harden MA  ?      The?physician?is present within?the office with the?nurse practitioner.??The?office visit?documentation and management have been?reviewed and agreed upon.? I will continue to follow?this patient along with?the nurse practitioner?for current and future care.

## 2022-05-02 NOTE — HISTORICAL [facility] CERNER
This is a historical note converted from Cecilia. Formatting and pictures may have been removed.  Please reference Cecilia for original formatting and attached multimedia. Chief Complaint  C/O lower abd pain, diarrhea, very watery, went to [place] last week, started Saturday morning  History of Present Illness  Patient is a 58-year-old white male who presents to clinic today?with?3-4 days?of?copious diarrhea-describes diarrhea as a brown?watery?consistency with no blood, no mucus.? Associated with intense?lower abdominal left and right?gas pain?that is immediately alleviated?by?diarrhea bowel movement. ?Positive mild nausea associated. ?No vomiting. ?Negative sick contacts.? No fever or chills.? He does describe to trip to [place] within the last 2 weeks where he did swim in a pool?and?ingested some pool water as well as?drinking water.??No recent illness.  Review of Systems  Constitutional_no fever chills,?no unintentional weight loss  Eye_  ENMT_  Respiratory_no shortness of breath or cough  Cardiovascular_no chest pain?or shortness of breath  Gastrointestinal_as per HPI  Genitourinary_  Hema/Lymph_  Endocrine_  Immunologic_  Musculoskeletal_  Integumentary_  Neurologic_  All Other ROS_negative  Physical Exam  Vitals & Measurements  HR:?60(Peripheral)? BP:?120/78?  HT:?185?cm? WT:?119.1?kg? BMI:?34.8?  VITAL SIGNS:? Reviewed.? ?  GENERAL:? In?no apparent distress.? Alert and Oriented x3  CHEST:? Chest with clear breath sounds bilaterally.??No wheezes, rales, or rhonchi. Good air movement  CARDIAC:??Regular rate and rhythm.? S1 and S2,?without murmurs, gallops, or rubs.  ABDOMINAL: Normal active BS X all 4 quadrants. Nontender. Nondistended.? Negative guarding. ?Negative rebound tenderness to palpation. ?Negative Ambriz sign.? Negative pain at [place]Burneys point. ?Negative CVA tenderness.? Hyperactive bowel sounds noted.? Negative tinkling or increased tympany.  NEUROLOGIC EXAM:? Alert and oriented x 3.? No focal  sensory or strength deficits.? ?Speech normal.? Follows commands.  MUSCULOSKELATAL: Full range of motion.? 5 out of 5 strength throughout.  SKIN: No rash. ?No lesion.  PSYCHIATRIC:? Mood normal.  ?  ?  Assessment/Plan  1.?Abdominal gas pain?R14.1  ?-Clinically stable with no signs of acute abdomen?in the office today  -ER precautions for worsening symptoms  Ordered:  CBC w/ Auto Diff, Routine collect, 01/29/19 15:58:00 CST, Blood, Order for future visit, Stop date 01/29/19 15:58:00 CST, Lab Collect, Abdominal gas pain  Diarrhea, 01/29/19 15:58:00 CST  Clostridium Difficile by PCR, Routine collect, 01/29/19 15:58:00 CST, Stool Clostrium difficile, Order for future visit, Stop date 01/29/19 15:58:00 CST, Nurse collect, Abdominal gas pain  Diarrhea, 01/29/19 15:58:00 CST  Comprehensive Metabolic Panel, Routine collect, 01/29/19 15:58:00 CST, Blood, Order for future visit, Stop date 01/29/19 15:58:00 CST, Lab Collect, Abdominal gas pain  Diarrhea, 01/29/19 15:58:00 CST  Fecal Leukocytes - Lactoferrin on stool, Routine collect, 01/29/19 15:58:00 CST, Stool, Order for future visit, Stop date 01/29/19 15:58:00 CST, Nurse collect, Abdominal gas pain  Diarrhea, 01/29/19 15:58:00 CST  Giardia/Cryptosporidium Antigen:, Routine collect, 01/29/19 15:58:00 CST, Stool, Order for future visit, Stop date 01/29/19 15:58:00 CST, Nurse collect, Abdominal gas pain  Diarrhea, 01/29/19 15:58:00 CST  Gram Stain, Routine collect, 01/29/19 15:58:00 CST, Order for future visit, Stool, Stop date 01/29/19 15:58:00 CST, Abdominal gas pain  Diarrhea  Lab Collection Request, 01/29/19 15:58:00 CST, HLINK AMB - [facility], 01/29/19 15:58:00 CST  Office/Outpatient Visit Level 4 Established [id number] PC, Abdominal gas pain  Diarrhea, [facility] AMB - [facility], 01/29/19 15:59:00 CST  Stool Culture, Routine collect, 01/29/19 15:58:00 CST, Order for future visit, Stool, Stop date 01/29/19 15:58:00 CST, Abdominal gas pain  Diarrhea  Urinalysis Complete no reflex,  Routine collect, Urine, Order for future visit, 01/29/19 15:58:00 CST, Stop date 01/29/19 15:58:00 CST, Nurse collect, Abdominal gas pain  Diarrhea  ?  2.?Diarrhea?R19.7  ?-Differential?diagnoses includes?viral gastroenteritis?versus?colitis versus travelers diarrhea  -Initiate low glucose electrolyte drinks such as propel?at least 100 ounces per day  -Initiate VS L #3 probiotic 1 capsule by mouth 3 times daily  -[place] diet described and educated upon-document given  -Lomotil 1 tablet by mouth 4 times daily as needed diarrhea times 4 days  -CBC/CMP/urinalysis/stool studies  -Follow-up?laboratory values and treat accordingly?based on lab data and symptom profile  Ordered:  atropine-diphenoxylate, 1 tab(s), Oral, QID, PRN PRN for loose stool, X 4 day(s), # 24 tab(s), 0 Refill(s), [facility]  CBC w/ Auto Diff, Routine collect, 01/29/19 15:58:00 CST, Blood, Order for future visit, Stop date 01/29/19 15:58:00 CST, Lab Collect, Abdominal gas pain  Diarrhea, 01/29/19 15:58:00 CST  Clostridium Difficile by PCR, Routine collect, 01/29/19 15:58:00 CST, Stool Clostrium difficile, Order for future visit, Stop date 01/29/19 15:58:00 CST, Nurse collect, Abdominal gas pain  Diarrhea, 01/29/19 15:58:00 CST  Comprehensive Metabolic Panel, Routine collect, 01/29/19 15:58:00 CST, Blood, Order for future visit, Stop date 01/29/19 15:58:00 CST, Lab Collect, Abdominal gas pain  Diarrhea, 01/29/19 15:58:00 CST  Fecal Leukocytes - Lactoferrin on stool, Routine collect, 01/29/19 15:58:00 CST, Stool, Order for future visit, Stop date 01/29/19 15:58:00 CST, Nurse collect, Abdominal gas pain  Diarrhea, 01/29/19 15:58:00 CST  Giardia/Cryptosporidium Antigen:, Routine collect, 01/29/19 15:58:00 CST, Stool, Order for future visit, Stop date 01/29/19 15:58:00 CST, Nurse collect, Abdominal gas pain  Diarrhea, 01/29/19 15:58:00 CST  Gram Stain, Routine collect, 01/29/19 15:58:00 CST, Order for future visit, Stool, Stop  date 01/29/19 15:58:00 CST, Abdominal gas pain  Diarrhea  Lab Collection Request, 01/29/19 15:58:00 CST, [facility]LINK AMB - [facility], 01/29/19 15:58:00 CST  Office/Outpatient Visit Level 4 Established [id number] PC, Abdominal gas pain  Diarrhea, HLINK AMB - [facility], 01/29/19 15:59:00 CST  Stool Culture, Routine collect, 01/29/19 15:58:00 CST, Order for future visit, Stool, Stop date 01/29/19 15:58:00 CST, Abdominal gas pain  Diarrhea  Urinalysis Complete no reflex, Routine collect, Urine, Order for future visit, 01/29/19 15:58:00 CST, Stop date 01/29/19 15:58:00 CST, Nurse collect, Abdominal gas pain  Diarrhea  ?  Orders:  Clinic Follow up, *Est. 02/26/19 3:00:00 CST, wellness, Order for future visit, Wellness examination, [facility]  Clinic Follow-up PRN, 01/29/19 16:00:00 CST, INK AMB - [facility], Future Order   Problem List/Past Medical History  Ongoing  Anxiety depression  Insomnia  Obesity  Tobacco abuse  Historical  No qualifying data  Medications  ENLYTE, 1 cap(s), Oral, Daily  Lomotil 2.5 mg-0.025 mg oral tablet, 1 tab(s), Oral, QID, PRN  NexIUM, Oral, Daily  Allergies  No Known Allergies  Social History  Tobacco  Former smoker, quit more than 30 days ago, N/A, 01/29/2019  Immunizations  Vaccine Date Status   influenza virus vaccine, inactivated 11/11/2018 Recorded   tetanus/diphth/pertuss (Tdap) adult/adol 2015 Recorded   Health Maintenance  Health Maintenance  ???Pending?(in the next year)  ??? ??OverDue  ??? ? ? ?Diabetes Screening due??and every?  ??? ??Due?  ??? ? ? ?ADL Screening due??01/29/19??and every 1??year(s)  ??? ? ? ?Alcohol Misuse Screening due??01/29/19??and every 1??year(s)  ??? ? ? ?Aspirin Therapy for CVD Prevention due??01/29/19??and every 1??year(s)  ??? ? ? ?Depression Screening due??01/29/19??and every?  ???Satisfied?(in the past 1 year)  ??? ??Satisfied?  ??? ? ? ?Blood Pressure Screening on??01/29/19.??Satisfied by Anitha Vargas CMA  ??? ? ? ?Body Mass Index Check on??01/29/19.??Satisfied by Sam MCALLISTER  Anitha AMANDA  ??? ? ? ?Influenza Vaccine on??11/11/18.??Satisfied by Anitha Vargas CMA  ??? ? ? ?Obesity Screening on??01/29/19.??Satisfied by Anitha Vargas CMA  ?  ?
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
## 2022-05-02 NOTE — HISTORICAL [facility] CERNER
This is a historical note converted from [employer]. Formatting and pictures may have been removed.  Please reference Cecilia for original formatting and attached multimedia. Chief Complaint  L foot pain  History of Present Illness  The patient is a?61 year old?male who presents today with symptoms of musculoskeletal pain located in the area of the left foot. This is an acute issue. 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 Duration of symptoms is?1+ week. Symptoms are [constant/intermittent]. The patient reports [no] weakness. The patient reports [no] focal neurologic deficits. The patient reports [no] numbness. Injury was [reported]. Associated symptoms include?sharp shooting pain with pressure when walks or puts weight on foot- using crutches as he was altering his gait causing his back to hurt- is able to put weight on however causes pain after a while. Other providers the patient has seen for this issue include no one at this time. He states last week he dropped a large can of beans on? his foot- states bruised, hurt for a day or so then was fine- roughly 3 days later intense pain returned with some redness and swelling to great toe area- concerned possible gout as he has had in the past  Review of Systems  Constitutional:?no weight gain,?no weight loss,?no fatigue,?no fever,?no chills,?no weakness,?no trouble sleeping.  Cardiovascular:?no chest pain or discomfort,?no tightness,?no palpitations,?no SOB with activity,?no difficulty breathing while supine,?no swelling,?no sudden awakening from sleep with SOB.  Respiratory:??no cough,?no sputum,?no coughing up blood,?no SOB,?no wheezing,?no painful breathing.  Musculoskeletal:?muscle or joint pain,?stiffness,?no back pain,?redness of joints,?swelling of joints,?trauma.  Skin:?no rashes,?no lumps,?no itching,?no dryness,?color normal for ethnicity,?no hair or nail changes.  Neurologic:?no dizziness,?no fainting,?no seizures,?no weakness,?no numbness,?no tingling,?no  tremors.  Physical Exam  General- In NAD, A&O x 4  ?   Respiratory- CTA, No wheezing, No crackles, No rhonchi  ?   Cardiovascular- RRR W/O MGR, Pulses equal throughout  ?   Musculoskeletal-?+ tenderness left great toe, Joints WNL- left great toe joint erythematous and swollen  ?   Integumentary- Warm, dry, intact, No lesions/rashes/hives  Assessment/Plan  1.?Trauma?T14.90XA  1. XRay appears with no acute fractures noted, final reading per Dr. Henderson- overview with Dr. Carreon- questionable tophi  Ordered:  1036F Current tobacco non-user, 08/09/21 16:22:00 CDT  1111F- Medication reconciliation completed within 30 days of an inpatient discharge to home, 08/09/21 16:22:00 CDT  Clinic Follow-up PRN, 08/09/21 16:22:00 CDT, [facility] - [facility], Future Order  Office/Outpatient Visit Level 4 Established 22560 PC, Trauma  Gout  Foot pain, [facility] - [facility], 08/09/21 16:22:00 CDT  Uric Acid, Routine collect, 08/09/21 16:26:00 CDT, Blood, Stop date 08/09/21 16:26:00 CDT, Lab Collect, Trauma  Gout, 08/09/21 16:26:00 CDT  ?  2.?Gout?M10.9  1. Celestone?12 mg IM x 1 now  2. Colchicine as directed  3. Indomethacin 25 mg ?tid prn pain as directed  4. Rest/Ice to area  5. Low Purine diet as discussed  6. F/U OV for any changes or worsening of symptoms  7. Uric Acid today  Ordered:  1036F Current tobacco non-user, 08/09/21 16:22:00 CDT  1111F- Medication reconciliation completed within 30 days of an inpatient discharge to home, 08/09/21 16:22:00 CDT  Clinic Follow-up PRN, 08/09/21 16:22:00 CDT, [facility] - [facility], Future Order  Office/Outpatient Visit Level 4 Established 61900 PC, Trauma  Gout  Foot pain, [facility], 08/09/21 16:22:00 CDT  Uric Acid, Routine collect, 08/09/21 16:26:00 CDT, Blood, Stop date 08/09/21 16:26:00 CDT, Lab Collect, Trauma  Gout, 08/09/21 16:26:00 CDT  ?  Orders:  indomethacin, 25 mg = 1 cap(s), Oral, TID, PRN PRN for gout pain, # 30 cap(s), 1 Refill(s), Pharmacy: [facility], 182, cm,  Height/Length Dosing, 06/18/21 7:44:00 CDT, 115.1, kg, Weight Dosing, 06/18/21 7:44:00 CDT  Referrals  Clinic Follow-up PRN, 08/09/21 16:22:00 CDT, [facility] - [facility], Future Order   Problem List/Past Medical History  Ongoing  Anxiety depression  Cataract  Chronic GERD  ED (erectile dysfunction)  HLD - Hyperlipidemia  Incidental pulmonary nodule  Insomnia  Obesity  Historical  Tobacco abuse  Procedure/Surgical History  24697 - RT CATARACT W/IOL 1 STA PHACO (Right) (09/01/2020)  Extracapsular cataract removal with insertion of intraocular lens prosthesis (1 stage procedure), manual or mechanical technique (eg, irrigation and aspiration or phacoemulsification); without endoscopic cyclophotocoagulation (09/01/2020)  Replacement of Right Lens with Synthetic Substitute, Percutaneous Approach (09/01/2020)  Colonoscopy (07/25/2016)  Tonsillectomy and adenoidectomy   Medications  aspirin 81 mg oral Delayed Release (EC) tablet, 81 mg= 1 tab(s), Oral, Daily  Cialis 20 mg oral tablet, 20 mg= 1 tab(s), Oral, Daily, PRN, 5 refills  Crestor 5 mg oral tablet, 5 mg= 1 tab(s), Oral, Once a day (at bedtime), 2 refills  esomeprazole 40 mg oral DR capsule, 40 mg= 1 cap(s), Oral, Daily, 1 refills  indomethacin 25 mg oral capsule, 25 mg= 1 cap(s), Oral, TID, PRN, 1 refills  ketoconazole 2% topical shampoo, See Instructions  NexIUM, Oral, Daily  Allergies  No Known Allergies  Social History  Abuse/Neglect  No, 09/01/2020  No, 08/28/2020  Alcohol  Never, 09/01/2020  Substance Use  Never, 09/01/2020  Tobacco  Former smoker, quit more than 30 days ago, N/A, 09/01/2020  Former smoker, quit more than 30 days ago, N/A, 04/22/2019  Former smoker, quit more than 30 days ago, N/A, 03/14/2019  Former smoker, quit more than 30 days ago, N/A, 01/29/2019  Family History  CAD - Coronary artery disease: Brother.  Leukemia: Father.  Primary malignant neoplasm of breast: Mother.  Primary malignant neoplasm of prostate: Father (Dx at 79).  TIA:  Mother.  Immunizations  Vaccine Date Status   zoster vaccine, inactivated 06/18/2021 Given   COVID-19 MRNA, LNP-S, PF- Pfizer 03/19/2021 Recorded   COVID-19 MRNA, LNP-S, PF- Pfizer 02/26/2021 Recorded   zoster vaccine, inactivated 01/13/2021 Recorded   influenza virus vaccine, inactivated 01/13/2021 Recorded   influenza virus vaccine, inactivated 10/02/2019 Recorded   influenza virus vaccine, inactivated 11/11/2018 Recorded   influenza virus vaccine, inactivated 01/25/2018 Recorded   pneumococcal 13-valent conjugate vaccine 2015 Recorded   tetanus/diphth/pertuss (Tdap) adult/adol 2015 Recorded   Health Maintenance  Health Maintenance  ???Pending?(in the next year)  ??? ??Due?  ??? ? ? ?ADL Screening due??08/10/21??and every 1??year(s)  ??? ??Due In Future?  ??? ? ? ?Obesity Screening not due until??01/01/22??and every 1??year(s)  ??? ? ? ?Alcohol Misuse Screening not due until??01/02/22??and every 1??year(s)  ??? ? ? ?Blood Pressure Screening not due until??06/18/22??and every 1??year(s)  ??? ? ? ?Body Mass Index Check not due until??06/18/22??and every 1??year(s)  ??? ? ? ?Depression Screening not due until??06/18/22??and every 1??year(s)  ??? ? ? ?Hypertension Management-BMP not due until??06/18/22??and every 1??year(s)  ??? ? ? ?Hypertension Management-Blood Pressure not due until??06/18/22??and every 1??year(s)  ??? ? ? ?Aspirin Therapy for CVD Prevention not due until??06/18/22??and every 1??year(s)  ???Satisfied?(in the past 1 year)  ??? ??Satisfied?  ??? ? ? ?Alcohol Misuse Screening on??06/18/21.??Satisfied by Maritza Lambert NP  ??? ? ? ?Aspirin Therapy for CVD Prevention on??06/18/21.??Satisfied by Maritza Lambert NP  ??? ? ? ?Blood Pressure Screening on??06/18/21.??Satisfied by Kadi Harden MA  ??? ? ? ?Body Mass Index Check on??06/18/21.??Satisfied by Kadi Harden MA  ??? ? ? ?Depression Screening on??06/18/21.??Satisfied by Maritza Lambert NP  ??? ? ? ?Diabetes Screening  on??06/18/21.??Satisfied by Ivana Low  ??? ? ? ?Hypertension Management-BMP on??06/18/21.??Satisfied by Ivana Low  ??? ? ? ?Hypertension Management-Blood Pressure on??06/18/21.??Satisfied by Kadi Harden MA  ??? ? ? ?Influenza Vaccine on??01/13/21.??Satisfied by Kadi Harden MA  ??? ? ? ?Lipid Screening on??06/18/21.??Satisfied by Ivana Low  ??? ? ? ?Obesity Screening on??06/18/21.??Satisfied by Kadi Harden MA  ??? ? ? ?Zoster Vaccine on??06/18/21.??Satisfied by Masha Oliver  ?      The?physician?is present within?the office with the?nurse practitioner.??The?office visit?documentation and management have been?reviewed and agreed upon.? I will continue to follow?this patient along with?the nurse practitioner?for current and future care.

## 2022-05-02 NOTE — HISTORICAL [facility] CERNER
This is a historical note converted from [employer]. Formatting and pictures may have been removed.  Please reference Cecilia for original formatting and attached multimedia. Chief Complaint  constipation, diarrhea, gas 2 wks  History of Present Illness  The patient is a 59-year-old?white male?who?reports to clinic today with complaints of?fluctuating constipation and diarrhea x2 weeks.? He reports some recent travel?to [facility] for business?but he reports having the symptoms prior to travel as well.? Diarrhea is a brown watery consistency with no pus or blood. ?Subjective fever and chills.? But he has not tried any type of over-the-counter medication as of yet.? No recent symptoms like this in the past.? No sick contacts?with similar symptoms.? She also reports scaling?and redness around his nasolabial folds and at his hairline  Review of Systems  Constitutional_no fever chills,?no unintentional weight loss  Eye_  ENMT_  Respiratory_no shortness of breath or cough  Cardiovascular_no chest pain?or shortness of breath  Gastrointestinal_as per HPI  Genitourinary_  Hema/Lymph_  Endocrine_  Immunologic_  Musculoskeletal_  Integumentary_as per HPI  Neurologic_  All Other ROS_negative  Physical Exam  Vitals & Measurements  BP:?130/76?  HT:?185?cm? WT:?113.9?kg? BMI:?33.28?  VITAL SIGNS:? Reviewed.? ?  GENERAL:? In?no apparent distress.? Alert and Oriented x3  CHEST:? Chest with clear breath sounds bilaterally.??No wheezes, rales, or rhonchi. Good air movement  CARDIAC:??Regular rate and rhythm.? S1 and S2,?without murmurs, gallops, or rubs.  ABDOMINAL: Normal active BS X all 4 quadrants. Nontender. Nondistended.? Noted increased bowel sounds/hyperactive all 4 quadrants.? Negative rebound tenderness to palpation. ?Negative guarding.? No tinkling?or increased tympany.? No hepatosplenomegaly.  NEUROLOGIC EXAM:? Alert and oriented x 3.? No focal sensory or strength deficits.? ?Speech normal.? Follows commands.  MUSCULOSKELATAL:  Full range of motion.? 5 out of 5 strength throughout.  SKIN: Noted scaling and erythema bilateral nasolabial folds and nuchal line?of hair  PSYCHIATRIC:? Mood normal.  ?  ?  Assessment/Plan  1.?Acute colitis?K52.9  ?-/CMP/UA/stool studies  -Documentation given for colitis and bland diet as well as increasing water to 100 ounces per day?or more  -Over-the-counter Imodium?as needed  -Cipro 500 mg 1 tab by mouth twice daily?x7 days dispense #14 no refills  -Flagyl 500 mg 1 tab by mouth?twice daily?x7 days?dispense 14 no refills  -Monitor for symptom change and ER precautions given for acutely worsening symptoms  Ordered:  ciprofloxacin, 500 mg = 1 tab(s), Oral, q12hr, X 7 day(s), # 14 tab(s), 0 Refill(s), Pharmacy: [facility]  metroNIDAZOLE, 500 mg = 1 tab(s), Oral, BID, X 7 day(s), # 14 tab(s), 0 Refill(s), Pharmacy: [facility]  Clostridium Difficile by PCR, Routine collect, 04/22/19 13:48:00 CDT, Stool Clostrium difficile, Order for future visit, Stop date 04/22/19 13:48:00 CDT, Nurse collect, Acute colitis, 04/22/19 13:48:00 CDT  Fecal Leukocytes - Lactoferrin on stool, Routine collect, 04/22/19 13:48:00 CDT, Stool, Order for future visit, Stop date 04/22/19 13:48:00 CDT, Nurse collect, Acute colitis, 04/22/19 13:48:00 CDT  Giardia/Cryptosporidium Antigen:, Routine collect, 04/22/19 13:48:00 CDT, Stool, Order for future visit, Stop date 04/22/19 13:48:00 CDT, Nurse collect, Acute colitis, 04/22/19 13:48:00 CDT  Gram Stain, Routine collect, 04/22/19 13:48:00 CDT, Order for future visit, Stool, Stop date 04/22/19 13:48:00 CDT, Acute colitis  Office/Outpatient Visit Level 4 Established 98282 PC, Acute colitis, [facility] - [facility], 04/22/19 13:48:00 CDT  Stool Culture, Routine collect, 04/22/19 13:48:00 CDT, Order for future visit, Stool, Stop date 04/22/19 13:48:00 CDT, Acute colitis  ?  2.?Seborrheic dermatitis?L21.9  ?-Ketoconazole 2% shampoo to apply to area?and leave lather on for 5 minutes then  rinse twice per week x4 weeks  -Educational document given on seborrheic dermatitis  Ordered:  ketoconazole topical, 1 seth, TOP, 2x/Wk, leave lather in hair and nasolabial fold 5min then rinse, # 120 mL, 1 Refill(s), Pharmacy: [facility]  ?  Orders:  Clinic Follow-up PRN, 04/22/19 13:48:00 CDT, [facility] - [facility], Future Order   Problem List/Past Medical History  Ongoing  Anxiety depression  Chronic GERD  ED (erectile dysfunction)  Incidental pulmonary nodule  Insomnia  Obesity  Historical  Tobacco abuse  Procedure/Surgical History  Colonoscopy (07/25/2016)   Medications  aspirin 81 mg oral Delayed Release (EC) tablet, 81 mg= 1 tab(s), Oral, Daily  Cialis 20 mg oral tablet, 20 mg= 1 tab(s), Oral, Daily, PRN, 5 refills  Cipro 500 mg oral tablet, 500 mg= 1 tab(s), Oral, q12hr  Coenzyme Q10 100 mg oral capsule, 100 mg= 1 cap(s), Oral, Daily  ketoconazole 2% topical shampoo, 1 seth, TOP, 2x/Wk, 1 refills  metronidazole 500 mg oral tablet, 500 mg= 1 tab(s), Oral, BID  NexIUM, Oral, Daily  rosuvastatin 20 mg oral tablet, 20 mg= 1 tab(s), Oral, Once a day (at bedtime), 11 refills  Allergies  No Known Allergies  Social History  Tobacco  Former smoker, quit more than 30 days ago, N/A, 04/22/2019  Former smoker, quit more than 30 days ago, N/A, 03/14/2019  Former smoker, quit more than 30 days ago, N/A, 01/29/2019  Family History  CAD - Coronary artery disease: Brother.  Leukemia: Father.  Primary malignant neoplasm of breast: Mother.  Primary malignant neoplasm of prostate: Father (Dx at 79).  TIA: Mother.  Immunizations  Vaccine Date Status   influenza virus vaccine, inactivated 11/11/2018 Recorded   pneumococcal 13-valent conjugate vaccine 2015 Recorded   tetanus/diphth/pertuss (Tdap) adult/adol 2015 Recorded   Health Maintenance  Health Maintenance  ???Pending?(in the next year)  ??? ??OverDue  ??? ? ? ?Diabetes Screening due??and every?  ??? ??Due?  ??? ? ? ?ADL Screening due??04/22/19??and every 1??year(s)  ??? ?  ? ?Alcohol Misuse Screening due??04/22/19??and every 1??year(s)  ??? ? ? ?Depression Screening due??04/22/19??and every?  ??? ? ? ?Zoster Vaccine due??04/22/19??and every 100??year(s)  ??? ??Due In Future?  ??? ? ? ?Aspirin Therapy for CVD Prevention not due until??03/15/20??and every 1??year(s)  ??? ? ? ?Blood Pressure Screening not due until??04/21/20??and every 1??year(s)  ??? ? ? ?Body Mass Index Check not due until??04/21/20??and every 1??year(s)  ??? ? ? ?Hypertension Management-BMP not due until??04/21/20??and every 1??year(s)  ??? ? ? ?Hypertension Management-Blood Pressure not due until??04/21/20??and every 1??year(s)  ???Satisfied?(in the past 1 year)  ??? ??Satisfied?  ??? ? ? ?Aspirin Therapy for CVD Prevention on??03/15/19.??Satisfied by Maurizio Carreon MD  ??? ? ? ?Blood Pressure Screening on??04/22/19.??Satisfied by Kadi Harden MA  ??? ? ? ?Body Mass Index Check on??04/22/19.??Satisfied by Kadi Harden MA  ??? ? ? ?Diabetes Screening on??04/22/19.??Satisfied by Ivana Low  ??? ? ? ?Hypertension Management-BMP on??04/22/19.??Satisfied by Ivana Low  ??? ? ? ?Influenza Vaccine on??11/11/18.??Satisfied by Anitha Vargas CMA  ??? ? ? ?Lipid Screening on??03/14/19.??Satisfied by Ivana Low  ??? ? ? ?Obesity Screening on??04/22/19.??Satisfied by Kadi Harden MA  ?  ?

## 2022-05-14 NOTE — OP NOTE
Patient:   Suleiman Rutledge            MRN: [id number]            FIN: [id number]               Age:   61 years     Sex:  Male     :  1960   Associated Diagnoses:   None   Author:   Lamberto Montanez MD      Preoperative Diagnosis: Cataract Left eye    Postoperative Diagnosis: Cataract Left eye    Procedure: Phacoemulsification with intaocular lens implantations Left eye    Surgeon: Lamberto Montanez MD    Assistant: Cinthya Sinclair [facility]    Anestheisa: MAC    Complications: None    The patient was brought into the operating suite, where the patient was correctly identified as was the operative eye via timeout.  The patient was prepped and draped in a sterile ophthalmic fashion.  A lid speculum was placed in the operative eye and the microscope was brought into place.  A 1.0mm paracentesis was then made at (6) o'clock.  The anterior chamber was filled with Endocoat.  A (temporal) clear corneal incision was made with a 2.4 mm keratome.  A 6 mm corneal marking ring was used to sumeet the cornea centered over the visual axis.  A 5.00 mm continuous curvilinear capsulorhexis was fashioned using a cystotome and microcapsular forceps.  Hydrodissection and hydrodelineation was performed with upreserved 1% Xylocaine.  The nucleus was then phacoemulsified with the Abbott phacoemulsification hand-piece with a total of (11) EFX.  The cortex was then removed with the I/A hand-piece. The lens model ([id number]) with a power of (29.5) was placed in the capsular bag.  The Helon was then removed from the eye with the I/A hand piece.  The anterior chamber was inflated and the wounds were hydrated with BSS.  The wounds were checked with Weck-Elizabeth sponges and found to be watertight.  The lid speculum was removed and topical antibiotics were placed on the operative eye.  The patient was brought to PACU in good condition.

## 2022-06-20 PROBLEM — K21.9 GASTROESOPHAGEAL REFLUX DISEASE: Status: ACTIVE | Noted: 2022-06-20

## 2022-06-20 PROBLEM — H25.9 AGE-RELATED CATARACT: Status: ACTIVE | Noted: 2022-06-20

## 2022-06-20 PROBLEM — E66.9 OBESITY: Status: ACTIVE | Noted: 2022-06-20

## 2022-06-20 PROBLEM — F41.8 MIXED ANXIETY DEPRESSIVE DISORDER: Status: ACTIVE | Noted: 2022-06-20

## 2022-06-20 PROBLEM — R91.1 SOLITARY PULMONARY NODULE: Status: ACTIVE | Noted: 2022-06-20

## 2022-06-20 PROBLEM — G47.00 INSOMNIA: Status: ACTIVE | Noted: 2022-06-20

## 2022-06-20 PROBLEM — N52.9 ED (ERECTILE DYSFUNCTION) OF ORGANIC ORIGIN: Status: ACTIVE | Noted: 2022-06-20

## 2022-06-20 PROBLEM — E78.5 HYPERLIPIDEMIA: Status: ACTIVE | Noted: 2022-06-20

## 2022-07-22 ENCOUNTER — PATIENT OUTREACH (OUTPATIENT)
Dept: ADMINISTRATIVE | Facility: HOSPITAL | Age: 62
End: 2022-07-22
Payer: COMMERCIAL

## 2022-09-16 ENCOUNTER — HISTORICAL (OUTPATIENT)
Dept: ADMINISTRATIVE | Facility: HOSPITAL | Age: 62
End: 2022-09-16
Payer: COMMERCIAL

## 2023-06-01 PROBLEM — I36.1 NONRHEUMATIC TRICUSPID VALVE REGURGITATION: Status: ACTIVE | Noted: 2023-06-01

## 2023-06-01 PROBLEM — I51.89 DIASTOLIC DYSFUNCTION: Status: ACTIVE | Noted: 2023-06-01

## 2024-01-08 PROBLEM — F51.01 PRIMARY INSOMNIA: Status: ACTIVE | Noted: 2022-06-20

## 2024-01-08 PROBLEM — Z87.891 FORMER SMOKER, STOPPED SMOKING IN DISTANT PAST: Status: ACTIVE | Noted: 2024-01-08

## 2024-01-08 PROBLEM — E78.2 MIXED HYPERLIPIDEMIA: Status: ACTIVE | Noted: 2022-06-20

## 2024-01-31 ENCOUNTER — HOSPITAL ENCOUNTER (OUTPATIENT)
Dept: RADIOLOGY | Facility: HOSPITAL | Age: 64
Discharge: HOME | End: 2024-01-31
Attending: FAMILY MEDICINE
Payer: MEDICARE

## 2024-01-31 DIAGNOSIS — Z12.2 SCREENING FOR LUNG CANCER: ICD-10-CM

## 2024-01-31 PROCEDURE — 71271 CT THORAX LUNG CANCER SCR C-: CPT | Mod: TC

## 2024-02-05 NOTE — PROGRESS NOTES
CT lung screen results please see below-call patient with results    FINDINGS:  Lung nodules:  Tiny nodules unchanged from last imaging study-no indication for further immediate investigation or intervention.        Impression:    Lung-RADS 2: Benign Appearance or Behavior.   Continue annual screening with LDCT in 12 months.

## 2025-01-20 PROBLEM — Z80.42 FAMILY HISTORY OF PROSTATE CANCER IN FATHER: Status: ACTIVE | Noted: 2025-01-20

## 2025-01-24 DIAGNOSIS — Z13.6 SCREENING FOR AAA (ABDOMINAL AORTIC ANEURYSM): Primary | ICD-10-CM

## 2025-02-11 PROCEDURE — 87086 URINE CULTURE/COLONY COUNT: CPT | Performed by: FAMILY MEDICINE

## 2025-02-17 ENCOUNTER — HOSPITAL ENCOUNTER (OUTPATIENT)
Dept: RADIOLOGY | Facility: HOSPITAL | Age: 65
Discharge: HOME | End: 2025-02-17
Attending: FAMILY MEDICINE
Payer: MEDICARE

## 2025-02-17 DIAGNOSIS — Z87.891 PERSONAL HISTORY OF TOBACCO USE, PRESENTING HAZARDS TO HEALTH: ICD-10-CM

## 2025-02-17 PROCEDURE — 71271 CT THORAX LUNG CANCER SCR C-: CPT | Mod: TC

## 2025-02-25 ENCOUNTER — HOSPITAL ENCOUNTER (OUTPATIENT)
Dept: RADIOLOGY | Facility: HOSPITAL | Age: 65
Discharge: HOME | End: 2025-02-25
Attending: FAMILY MEDICINE
Payer: MEDICARE

## 2025-02-25 DIAGNOSIS — Z13.6 SCREENING FOR AAA (ABDOMINAL AORTIC ANEURYSM): ICD-10-CM

## 2025-02-25 PROCEDURE — 76706 US ABDL AORTA SCREEN AAA: CPT | Mod: TC

## 2025-02-27 PROBLEM — M10.9 ACUTE GOUT INVOLVING TOE OF RIGHT FOOT: Status: ACTIVE | Noted: 2025-02-27
